# Patient Record
Sex: MALE | Race: WHITE | Employment: FULL TIME | URBAN - METROPOLITAN AREA
[De-identification: names, ages, dates, MRNs, and addresses within clinical notes are randomized per-mention and may not be internally consistent; named-entity substitution may affect disease eponyms.]

---

## 2017-10-16 ENCOUNTER — TRANSFERRED RECORDS (OUTPATIENT)
Dept: HEALTH INFORMATION MANAGEMENT | Facility: CLINIC | Age: 27
End: 2017-10-16

## 2017-10-27 ENCOUNTER — NURSE TRIAGE (OUTPATIENT)
Dept: NURSING | Facility: CLINIC | Age: 27
End: 2017-10-27

## 2017-10-27 NOTE — TELEPHONE ENCOUNTER
Regarding: results from semen analysis   ----- Message from Otilia Limon sent at 10/27/2017  5:32 PM CDT -----  Reason for call: other  Patient called regarding (reason for call): call back  Additional comments: semen analysis results     Phone number to reach patient:  Other phone number:  [phone]    Best Time:  any    Can we leave a detailed message on this number?  YES

## 2017-11-07 ENCOUNTER — TELEPHONE (OUTPATIENT)
Dept: OBGYN | Facility: CLINIC | Age: 27
End: 2017-11-07

## 2017-11-07 NOTE — TELEPHONE ENCOUNTER
Patient calling again for results of semen analysis.  He has been trying to get the results for awhile.  He even called [facility] to see if they would release the results to him but they would not.  Please call pt with results ASAP.  Nothing scanned into chart.  FRANCES Ernandez R.N.

## 2017-11-14 NOTE — TELEPHONE ENCOUNTER
I left the patient voiced no message today stating that his testing that he had done [facility] was normal.  I did not leave specific information on his voicemail.  I will abstract the results of the semen analysis into the patient's chart. The semen analysis results from October 16, 2017 were all within normal limits. His partner is Luzma Dominick Frankel M.D.

## 2017-11-14 NOTE — TELEPHONE ENCOUNTER
"Per partner, Luzma Tan's ( 1989) chart: \"The semen analysis done on her partner Christiano Linn  Date of birth 1990  Done at [facility] on 2017 showed the following  PH 7.6  Normal  Appearance  Normal  Viscosity    Normal  None sperm cells 0.2  Agglutination  None  Progressive motility 77%  Nonprogressive motility 2%    Immotile  21%  Total multiple sperm   79%  Sperm count 59,500,000/mL  Total sperm count 208,300,000  Progressive multiple sperm 160.4 million  Vitality 76%  Normal forms 6%  Primary defect  Head    This all translates to a normal semen analysis.    Please notify the patient of a  normal semen analysis for her partner\"    No consent to communicate noted in pt's chart for partner, Luzma. Did not call pt again as MD lozada left vm on his phone.  "

## 2018-01-25 ENCOUNTER — NURSE TRIAGE (OUTPATIENT)
Dept: NURSING | Facility: CLINIC | Age: 28
End: 2018-01-25

## 2018-01-25 NOTE — TELEPHONE ENCOUNTER
"  Additional Information    Lab result questions    [1] Other NON-URGENT information for PCP AND [2] does not require PCP response     \"I am calling for my semen analysis results from November.\" Gave per MD/epic.    Protocols used: INFORMATION ONLY CALL-ADULT-AH, PCP CALL - NO TRIAGE-ADULT-AH    "

## 2021-06-02 ENCOUNTER — RECORDS - HEALTHEAST (OUTPATIENT)
Dept: ADMINISTRATIVE | Facility: CLINIC | Age: 31
End: 2021-06-02